# Patient Record
Sex: MALE | Race: WHITE
[De-identification: names, ages, dates, MRNs, and addresses within clinical notes are randomized per-mention and may not be internally consistent; named-entity substitution may affect disease eponyms.]

---

## 2020-05-20 ENCOUNTER — HOSPITAL ENCOUNTER (INPATIENT)
Dept: HOSPITAL 62 - ER | Age: 55
LOS: 2 days | Discharge: HOME | DRG: 603 | End: 2020-05-22
Attending: INTERNAL MEDICINE | Admitting: INTERNAL MEDICINE
Payer: MEDICARE

## 2020-05-20 DIAGNOSIS — L03.213: ICD-10-CM

## 2020-05-20 DIAGNOSIS — Z96.659: ICD-10-CM

## 2020-05-20 DIAGNOSIS — Z86.14: ICD-10-CM

## 2020-05-20 DIAGNOSIS — J44.9: ICD-10-CM

## 2020-05-20 DIAGNOSIS — F17.210: ICD-10-CM

## 2020-05-20 DIAGNOSIS — L03.211: Primary | ICD-10-CM

## 2020-05-20 DIAGNOSIS — Z79.899: ICD-10-CM

## 2020-05-20 DIAGNOSIS — Z03.818: ICD-10-CM

## 2020-05-20 LAB
ADD MANUAL DIFF: NO
ALBUMIN SERPL-MCNC: 3.9 G/DL (ref 3.5–5)
ALP SERPL-CCNC: 126 U/L (ref 38–126)
ANION GAP SERPL CALC-SCNC: 6 MMOL/L (ref 5–19)
AST SERPL-CCNC: 52 U/L (ref 17–59)
BASE EXCESS BLDV CALC-SCNC: -1.4 MMOL/L
BASOPHILS # BLD AUTO: 0.1 10^3/UL (ref 0–0.2)
BASOPHILS NFR BLD AUTO: 0.6 % (ref 0–2)
BILIRUB DIRECT SERPL-MCNC: 0 MG/DL (ref 0–0.4)
BILIRUB SERPL-MCNC: 0.5 MG/DL (ref 0.2–1.3)
BUN SERPL-MCNC: 18 MG/DL (ref 7–20)
CALCIUM: 8.6 MG/DL (ref 8.4–10.2)
CHLORIDE SERPL-SCNC: 103 MMOL/L (ref 98–107)
CO2 SERPL-SCNC: 26 MMOL/L (ref 22–30)
EOSINOPHIL # BLD AUTO: 0.9 10^3/UL (ref 0–0.6)
EOSINOPHIL NFR BLD AUTO: 8 % (ref 0–6)
ERYTHROCYTE [DISTWIDTH] IN BLOOD BY AUTOMATED COUNT: 15.6 % (ref 11.5–14)
GLUCOSE SERPL-MCNC: 109 MG/DL (ref 75–110)
HCO3 BLDV-SCNC: 25.6 MMOL/L (ref 20–32)
HCT VFR BLD CALC: 39.2 % (ref 37.9–51)
HGB BLD-MCNC: 13.2 G/DL (ref 13.5–17)
INR PPP: 0.94
LYMPHOCYTES # BLD AUTO: 1 10^3/UL (ref 0.5–4.7)
LYMPHOCYTES NFR BLD AUTO: 9.3 % (ref 13–45)
MCH RBC QN AUTO: 25.4 PG (ref 27–33.4)
MCHC RBC AUTO-ENTMCNC: 33.8 G/DL (ref 32–36)
MCV RBC AUTO: 75 FL (ref 80–97)
MONOCYTES # BLD AUTO: 1.4 10^3/UL (ref 0.1–1.4)
MONOCYTES NFR BLD AUTO: 12.8 % (ref 3–13)
NEUTROPHILS # BLD AUTO: 7.4 10^3/UL (ref 1.7–8.2)
NEUTS SEG NFR BLD AUTO: 69.3 % (ref 42–78)
PCO2 BLDV: 52.2 MMHG (ref 35–63)
PH BLDV: 7.31 [PH] (ref 7.3–7.42)
PLATELET # BLD: 252 10^3/UL (ref 150–450)
POTASSIUM SERPL-SCNC: 4.4 MMOL/L (ref 3.6–5)
PROT SERPL-MCNC: 6.6 G/DL (ref 6.3–8.2)
PROTHROMBIN TIME: 12.6 SEC (ref 11.4–15.4)
RBC # BLD AUTO: 5.22 10^6/UL (ref 4.35–5.55)
TOTAL CELLS COUNTED % (AUTO): 100 %
WBC # BLD AUTO: 10.7 10^3/UL (ref 4–10.5)

## 2020-05-20 PROCEDURE — 85610 PROTHROMBIN TIME: CPT

## 2020-05-20 PROCEDURE — 87635 SARS-COV-2 COVID-19 AMP PRB: CPT

## 2020-05-20 PROCEDURE — 82803 BLOOD GASES ANY COMBINATION: CPT

## 2020-05-20 PROCEDURE — 87040 BLOOD CULTURE FOR BACTERIA: CPT

## 2020-05-20 PROCEDURE — 80202 ASSAY OF VANCOMYCIN: CPT

## 2020-05-20 PROCEDURE — 85025 COMPLETE CBC W/AUTO DIFF WBC: CPT

## 2020-05-20 PROCEDURE — 96365 THER/PROPH/DIAG IV INF INIT: CPT

## 2020-05-20 PROCEDURE — 99284 EMERGENCY DEPT VISIT MOD MDM: CPT

## 2020-05-20 PROCEDURE — 80053 COMPREHEN METABOLIC PANEL: CPT

## 2020-05-20 PROCEDURE — 83605 ASSAY OF LACTIC ACID: CPT

## 2020-05-20 PROCEDURE — 96375 TX/PRO/DX INJ NEW DRUG ADDON: CPT

## 2020-05-20 PROCEDURE — 70487 CT MAXILLOFACIAL W/DYE: CPT

## 2020-05-20 PROCEDURE — 36415 COLL VENOUS BLD VENIPUNCTURE: CPT

## 2020-05-20 RX ADMIN — KETOROLAC TROMETHAMINE PRN MG: 30 INJECTION, SOLUTION INTRAMUSCULAR at 13:35

## 2020-05-20 RX ADMIN — VANCOMYCIN HYDROCHLORIDE SCH MLS/HR: 1 INJECTION, POWDER, LYOPHILIZED, FOR SOLUTION INTRAVENOUS at 22:05

## 2020-05-20 RX ADMIN — PIPERACILLIN AND TAZOBACTAM SCH MLS/HR: 3; .375 INJECTION, POWDER, LYOPHILIZED, FOR SOLUTION INTRAVENOUS; PARENTERAL at 16:45

## 2020-05-20 RX ADMIN — PIPERACILLIN AND TAZOBACTAM SCH MLS/HR: 3; .375 INJECTION, POWDER, LYOPHILIZED, FOR SOLUTION INTRAVENOUS; PARENTERAL at 20:13

## 2020-05-20 RX ADMIN — VANCOMYCIN HYDROCHLORIDE SCH MLS/HR: 1 INJECTION, POWDER, LYOPHILIZED, FOR SOLUTION INTRAVENOUS at 14:22

## 2020-05-20 RX ADMIN — KETOROLAC TROMETHAMINE PRN MG: 30 INJECTION, SOLUTION INTRAMUSCULAR at 21:35

## 2020-05-20 RX ADMIN — MORPHINE SULFATE PRN MG: 10 INJECTION INTRAMUSCULAR; INTRAVENOUS; SUBCUTANEOUS at 14:38

## 2020-05-20 NOTE — RADIOLOGY REPORT (SQ)
EXAM DESCRIPTION:  CT FACIAL AREA WITH



IMAGES COMPLETED DATE/TIME:  5/20/2020 10:15 am



REASON FOR STUDY:  facial infection, swelling



COMPARISON:  None.



TECHNIQUE:  Post contrast images through the facial bones and orbits windowed for bone and soft tissu
e.  Additional coronal and sagittal reconstructed images reviewed.  All images stored on PACS.

All CT scanners at this facility use dose modulation, iterative reconstruction, and/or weight based d
osing when appropriate to reduce radiation dose to as low as reasonably achievable (ALARA).

[facility]: Dose Right  [facility]: CareDose    [facility]: Dose Right    [facility]: Teradose 4D    [facility]: Smart Technologies



CONTRAST TYPE AND DOSE:  contrast/concentration: Isovue 350.00 mg/ml; Total Contrast Delivered: 75.0 
ml; Total Saline Delivered: 55.0 ml



RENAL FUNCTION:  BUN 18, creatinine 0.73



RADIATION DOSE:  CT Rad equipment meets quality standard of care and radiation dose reduction techniq
ues were employed. CTDIvol: 30.4 mGy. DLP: 650 mGy-cm. .



LIMITATIONS:  None.



FINDINGS:  FACIAL BONES: No fracture or bone lesion.

ORBITS: Intact.  No fracture.  Symmetric intact globes and retroorbital soft tissues.

PARANASAL SINUSES: Mucosal thickening in the maxillary sinuses and ethmoid air cells. Occluded left o
stiomeatal unit.

SOFT TISSUES: There is subcutaneous edema involving the left aspect of the face.  There is asymmetric
 edema overlying the left aspect of the maxilla.  Subtle area of decreased attenuation midline on jennie
ge 37 of series 3.  This could represent volume averaging with surrounding fat.  Developing abscess c
annot be excluded.  This is well demonstrated on image 31 of series 200 as well.

INFERIOR BRAIN: Limited view.  No acute findings.

OTHER: Scattered bilateral cervical nodes most likely reactive.



IMPRESSION:  Suspect left facial cellulitis.  Asymmetric soft tissue attenuation involving the soft t
issues anterior to the left maxillary sinus and maxilla as described.  Developing abscess centrally o
verlying the maxilla cannot be excluded.  This measures approximately 1 x 1 cm in greatest diameter. 
 This is best demonstrated on series 3 image 37.



TECHNICAL DOCUMENTATION:  JOB ID:  [id number]

Quality ID # [id number]: Final reports with documentation of one or more dose reduction techniques (e.g., Au
tomated exposure control, adjustment of the mA and/or kV according to patient size, use of iterative 
reconstruction technique)

 2011 [email or web address]- All Rights Reserved



Reading location - IP/workstation name: [email or web address]

## 2020-05-20 NOTE — ER DOCUMENT REPORT
ED Skin Rash/Insect Bite/Abscs





- General


Chief Complaint: Facial Swelling


Stated Complaint: FACIAL EDEMA


Time Seen by Provider: 05/20/20 08:59


Mode of Arrival: Ambulatory


Information source: Patient


Notes: 





Patient presents complaint of facial swelling for the past 2 days.  Patient 

states that he had a bump inside his nostril and that the area surrounding it 

seemed to get infected.  Patient reports a fever 2 days ago.  Patient does have 

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 

wall.  Patient states he was seen at an urgent care and started on Keflex and 

Bactrim 2 days ago.  Patient denies any improvement despite taking the 

antibiotics.





- HPI


Patient complains to provider of: Tender/swollen area


Onset: Other - 2 days ago


Onset/Duration: Worse


Quality of pain: Sharp


Pain Level: 5


Skin Character: Erythema, Tenderness, Warm


Skin Temperature: Warm


Quality of rash: Painful


Relieved by: Denies


Similar symptoms previously: Yes


Recently seen / treated by doctor: Yes





- Related Data


Allergies/Adverse Reactions: 


                                        





No Known Allergies Allergy (Unverified 05/20/20 10:28)


   








Home Medications: Bactrim.  Breo





Past Medical History





- General


Information source: Patient





- Social History


Smoking Status: Current Every Day Smoker


Chew tobacco use (# tins/day): No


Frequency of alcohol use: None


Drug Abuse: None


Occupation: None


Family History: Reviewed & Not Pertinent


Patient has homicidal ideation: No


Pulmonary Medical History: Reports: Hx COPD


Musculoskeletal Medical History: Reports Hx Arthritis - Chronic back pain


Skin Medical History: Reports Hx MRSA


Past Surgical History: Reports: Hx Orthopedic Surgery - back, knee replacement





Review of Systems





- Review of Systems


Constitutional: No symptoms reported.  denies: Fever


EENT: Other - Tenderness, swelling to the nose and facial area


Cardiovascular: No symptoms reported


Respiratory: No symptoms reported.  denies: Cough, Short of breath


Gastrointestinal: No symptoms reported.  denies: Vomiting


Genitourinary: No symptoms reported


Male Genitourinary: No symptoms reported


Musculoskeletal: No symptoms reported


Skin: Other - Erythema to left facial area, crusted skin lesion inside nostril


Hematologic/Lymphatic: No symptoms reported


Neurological/Psychological: No symptoms reported





Physical Exam





- Vital signs


Vitals: 


                                        











Temp


 


 98.2 F 


 


 05/20/20 07:52














- General


General appearance: Appears well, Alert


In distress: None





- HEENT


Head: Normocephalic, Atraumatic


Eyes: Normal


Conjunctiva: Normal


Nasal: Other - Crusted skin lesion inside the left nostril, small pustular 

lesion outside of left nostril


Mucous membranes: Normal


Pharynx: Normal


Notes: 





Left maxillary facial tenderness with faint erythema





- Respiratory


Respiratory status: No respiratory distress


Chest status: Nontender


Breath sounds: Normal.  No: Rales, Rhonchi, Stridor, Wheezing


Chest palpation: Normal





- Cardiovascular


Rhythm: Regular


Heart sounds: S1 appreciated, S2 appreciated





- Back


Back: Normal





- Extremities


General upper extremity: Normal inspection, Normal strength


General lower extremity: Normal inspection, Normal strength





- Neurological


Neuro grossly intact: Yes


Cognition: Normal


Keller Coma Scale Eye Opening: Spontaneous


Katia Coma Scale Verbal: Oriented


Keller Coma Scale Motor: Obeys Commands


Katia Coma Scale Total: 15





- Psychological


Associated symptoms: Normal affect, Normal mood





- Skin


Skin Temperature: Warm


Skin Moisture: Dry


Skin Color: Erythema - Faint erythema to left maxillary facial area


Skin irregularity: other - Pustular lesion just inferior of left nostril, 

crusted area inside left nostril





Course





- Re-evaluation


Re-evalutation: 





05/20/20 11:25


Consulted with Dr. Sheikh regarding patient presentation and diagnostic 

evaluation.  Recommends consultation with ENT Dr. Duarte,  placed call 

for consult, awaiting return call.


05/20/20 12:00


Consulted with Dr. Duarte and reviewed patient's presentation and diagnostic 

evaluation with him.  Discussed findings on CT scan.  Dr. Hopper feels that 

patient requires admission by the hospitalist at this time for IV antibiotics 

such as when isolated and continuing his Bactrim, although states that 

antibiotic choice can be guided by the hospitalist at this time. Dr Duarte states

that if the hospitalist needs to reimage the patient at some point and patient 

does have findings that require his services, ENT can be consulted at that 

point.





05/20/20 12:12


Consulted with Dr. Lou who does agree to admit patient to a medical floor at 

this time.  Dr. Lou requests that an order for consultation for Dr. Duarte be 

placed.





- Vital Signs


Vital signs: 


                                        











Temp Pulse Resp BP Pulse Ox


 


 99.4 F   82   18   140/69 H  99 


 


 05/20/20 15:55  05/20/20 15:55  05/20/20 15:55  05/20/20 15:55  05/20/20 15:55














- Laboratory


Result Diagrams: 


                                 05/20/20 08:30





                                 05/20/20 08:30


Laboratory results interpreted by me: 


                                        











  05/20/20 05/20/20





  08:30 08:30


 


WBC  10.7 H 


 


Hgb  13.2 L 


 


MCV  75 L 


 


MCH  25.4 L 


 


RDW  15.6 H 


 


Lymph % (Auto)  9.3 L 


 


Eos % (Auto)  8.0 H 


 


Absolute Eos (auto)  0.9 H 


 


Sodium   135.0 L











05/20/20 12:13





                               Labs- Entire Visit











  05/20/20 05/20/20 05/20/20





  08:30 08:30 08:30


 


WBC  10.7 H  


 


RBC  5.22  


 


Hgb  13.2 L  


 


Hct  39.2  


 


MCV  75 L  


 


MCH  25.4 L  


 


MCHC  33.8  


 


RDW  15.6 H  


 


Plt Count  252  


 


Lymph % (Auto)  9.3 L  


 


Mono % (Auto)  12.8  


 


Eos % (Auto)  8.0 H  


 


Baso % (Auto)  0.6  


 


Absolute Neuts (auto)  7.4  


 


Absolute Lymphs (auto)  1.0  


 


Absolute Monos (auto)  1.4  


 


Absolute Eos (auto)  0.9 H  


 


Absolute Basos (auto)  0.1  


 


Seg Neutrophils %  69.3  


 


PT   12.6 


 


INR   0.94 


 


VBG pH   


 


VBG pCO2   


 


VBG HCO3   


 


VBG Base Excess   


 


Sodium    135.0 L


 


Potassium    4.4


 


Chloride    103


 


Carbon Dioxide    26


 


Anion Gap    6


 


BUN    18


 


Creatinine    0.73


 


Est GFR ( Amer)    > 60


 


Est GFR (MDRD) Non-Af    > 60


 


Glucose    109


 


Lactic Acid   


 


Calcium    8.6


 


Total Bilirubin    0.5


 


Direct Bilirubin    0.0


 


Neonat Total Bilirubin    Not Reportable


 


Neonat Direct Bilirubin    Not Reportable


 


Neonat Indirect Bili    Not Reportable


 


AST    52


 


ALT    48


 


Alkaline Phosphatase    126


 


Total Protein    6.6


 


Albumin    3.9














  05/20/20 05/20/20





  08:30 08:30


 


WBC  


 


RBC  


 


Hgb  


 


Hct  


 


MCV  


 


MCH  


 


MCHC  


 


RDW  


 


Plt Count  


 


Lymph % (Auto)  


 


Mono % (Auto)  


 


Eos % (Auto)  


 


Baso % (Auto)  


 


Absolute Neuts (auto)  


 


Absolute Lymphs (auto)  


 


Absolute Monos (auto)  


 


Absolute Eos (auto)  


 


Absolute Basos (auto)  


 


Seg Neutrophils %  


 


PT  


 


INR  


 


VBG pH  7.31 


 


VBG pCO2  52.2 


 


VBG HCO3  25.6 


 


VBG Base Excess  -1.4 


 


Sodium  


 


Potassium  


 


Chloride  


 


Carbon Dioxide  


 


Anion Gap  


 


BUN  


 


Creatinine  


 


Est GFR ( Amer)  


 


Est GFR (MDRD) Non-Af  


 


Glucose  


 


Lactic Acid   0.9


 


Calcium  


 


Total Bilirubin  


 


Direct Bilirubin  


 


Neonat Total Bilirubin  


 


Neonat Direct Bilirubin  


 


Neonat Indirect Bili  


 


AST  


 


ALT  


 


Alkaline Phosphatase  


 


Total Protein  


 


Albumin  














- Diagnostic Test


Radiology reviewed: Reports reviewed





Discharge





- Discharge


Clinical Impression: 


 Facial cellulitis





Condition: Stable


Disposition: ADMITTED AS OBSERVATION


Admitting Provider: Carmine (Hospitalist)


Unit Admitted: Medical Floor

## 2020-05-20 NOTE — PDOC H&P
History of Present Illness


Admission Date/PCP: 


  05/20/20 12:32





  





Patient complains of: Left facial swelling and pain


History of Present Illness: 


DARCI LOERA III is a 54 year old male with a history of COPD, 

arthritis, who presents to the hospital with complaints of pain in his left 

facial region.  Symptoms started a few days ago as pain in his left nostril 

associated with a slight bump there.  He states that he squeezed the bump 

without expression of any fluid and then the next day the swelling in his left 

face worsened and now starting to progress towards his periorbital region.  He 

saw his primary care provider who prescribed him Bactrim and Keflex as well as a

topical medication which she has been taking for the past 2 days.  However his 

symptoms especially the swelling in his face has worsened which prompted him to 

seek medical care at the hospital.  He admits to a history of MRSA abscess to 

his lower extremities requiring drainage.








Past Medical History


Pulmonary Medical History: Reports: Chronic Obstructive Pulmonary Disease (COPD)





Past Surgical History


Past Surgical History: Reports: Orthopedic Surgery - back, knee replacement





Social History


Smoking Status: Current Every Day Smoker


Electronic Cigarette use?: No


Frequency of Alcohol Use: Rare


Hx Recreational Drug Use: No





- Advance Directive


Resuscitation Status: Full Code





Family History


Family History: Hypertension


Parental Family History Reviewed: Yes


Children Family History Reviewed: Unknown


Sibling(s) Family History Reviewed.: Unknown





Medication/Allergy


Allergies/Adverse Reactions: 


                                        





No Known Allergies Allergy (Unverified 05/20/20 10:28)


   











Review of Systems


Constitutional: PRESENT: chills, fever(s) - A few days ago


Eyes: PRESENT: other - Denies ophthalmoplegia, chemosis or eye pain.  ABSENT: 

visual disturbances


Ears: ABSENT: hearing changes


Nose, Mouth, and Throat: ABSENT: headache(s)


Cardiovascular: ABSENT: chest pain


Respiratory: ABSENT: cough, dyspnea, sputum


Gastrointestinal: ABSENT: abdominal pain, nausea, vomiting


Genitourinary: ABSENT: dysuria


Neurological: ABSENT: dizziness


Psychiatric: ABSENT: anxiety


Endocrine: ABSENT: polyuria


Allergic/Immunologic: PRESENT: seasonal rhinorrhea





Physical Exam


Vital Signs: 


                                        











Temp Pulse Resp BP Pulse Ox


 


 98.2 F   75   20   142/85 H  100 


 


 05/20/20 08:00  05/20/20 08:00  05/20/20 08:00  05/20/20 08:00  05/20/20 08:00








                                 Intake & Output











 05/19/20 05/20/20 05/21/20





 06:59 06:59 06:59


 


Weight   95.254 kg











General appearance: PRESENT: no acute distress, cooperative


Head exam: PRESENT: other - Left facial swelling involving upper and lower face 

and preseptal region with erythema.  Mild erythema of the left nasal cavity.


Eye exam: PRESENT: other - No chemosis.  ABSENT: conjunctival injection, scleral

icterus


Mouth exam: PRESENT: neck supple


Neck exam: ABSENT: meningismus, tenderness


Respiratory exam: PRESENT: clear to auscultation rose, unlabored


Cardiovascular exam: PRESENT: RRR, +S1, +S2


GI/Abdominal exam: PRESENT: soft.  ABSENT: tenderness


Extremities exam: ABSENT: calf tenderness, pedal edema


Neurological exam: PRESENT: alert, awake


Psychiatric exam: ABSENT: agitated, anxious


Focused psych exam: ABSENT: pressured speech


Skin exam: ABSENT: jaundice





Results


Laboratory Results: 


                                        





                                 05/20/20 08:30 





                                 05/20/20 08:30 





                                        











  05/20/20 05/20/20 05/20/20





  08:30 08:30 08:30


 


WBC  10.7 H  


 


RBC  5.22  


 


Hgb  13.2 L  


 


Hct  39.2  


 


MCV  75 L  


 


MCH  25.4 L  


 


MCHC  33.8  


 


RDW  15.6 H  


 


Plt Count  252  


 


Seg Neutrophils %  69.3  


 


VBG pH    7.31


 


VBG pCO2    52.2


 


VBG HCO3    25.6


 


VBG Base Excess    -1.4


 


Sodium   135.0 L 


 


Potassium   4.4 


 


Chloride   103 


 


Carbon Dioxide   26 


 


Anion Gap   6 


 


BUN   18 


 


Creatinine   0.73 


 


Est GFR ( Amer)   > 60 


 


Glucose   109 


 


Lactic Acid   


 


Calcium   8.6 


 


Total Bilirubin   0.5 


 


AST   52 


 


Alkaline Phosphatase   126 


 


Total Protein   6.6 


 


Albumin   3.9 














  05/20/20





  08:30


 


WBC 


 


RBC 


 


Hgb 


 


Hct 


 


MCV 


 


MCH 


 


MCHC 


 


RDW 


 


Plt Count 


 


Seg Neutrophils % 


 


VBG pH 


 


VBG pCO2 


 


VBG HCO3 


 


VBG Base Excess 


 


Sodium 


 


Potassium 


 


Chloride 


 


Carbon Dioxide 


 


Anion Gap 


 


BUN 


 


Creatinine 


 


Est GFR (African Amer) 


 


Glucose 


 


Lactic Acid  0.9


 


Calcium 


 


Total Bilirubin 


 


AST 


 


Alkaline Phosphatase 


 


Total Protein 


 


Albumin 











Impressions: 


                                        





Facial Bones CT  05/20/20 09:20


IMPRESSION:  Suspect left facial cellulitis.  Asymmetric soft tissue attenuation

involving the soft tissues anterior to the left maxillary sinus and maxilla as 

described.  Developing abscess centrally overlying the maxilla cannot be 

excluded.  This measures approximately 1 x 1 cm in greatest diameter.  This is 

best demonstrated on series 3 image 37.


 














Assessment and Plan





- Diagnosis


(1) Facial cellulitis


Is this a current diagnosis for this admission?: Yes   


Plan: 


Facial cellulitis with preseptal extension.  No evidence of orbital involvement.

 Failed outpatient therapy with worsening of symptoms after 48 hours of Bactrim 

and Keflex.


CT showing soft tissue swelling with questionable developing abscess in the left

maxillary sinus.


Blood cultures obtained


Start on vancomycin and Zosyn


Received IV morphine. Tylenol and IV Toradol as needed pain


Monitor leukocytosis via CBC








(2) COPD (chronic obstructive pulmonary disease)


Qualifiers: 


   COPD type: unspecified COPD   Qualified Code(s): J44.9 - Chronic obstructive 

pulmonary disease, unspecified   


Is this a current diagnosis for this admission?: Yes   


Plan: 


Not seen at acute exacerbation.  LABA/L AMA/ICS.  Duo nebs as needed.








(3) Tobacco abuse


Is this a current diagnosis for this admission?: Yes   


Plan: 


Nicotine patch will be provided.








- Time


Time Spent with patient: 25-34 minutes





- Inpatient Certification


Medical Necessity: Failure to Improve With Outpatient Therapy

## 2020-05-21 LAB
ADD MANUAL DIFF: NO
BASOPHILS # BLD AUTO: 0.1 10^3/UL (ref 0–0.2)
BASOPHILS NFR BLD AUTO: 0.7 % (ref 0–2)
EOSINOPHIL # BLD AUTO: 1.1 10^3/UL (ref 0–0.6)
EOSINOPHIL NFR BLD AUTO: 12.6 % (ref 0–6)
ERYTHROCYTE [DISTWIDTH] IN BLOOD BY AUTOMATED COUNT: 15.6 % (ref 11.5–14)
HCT VFR BLD CALC: 40.2 % (ref 37.9–51)
HGB BLD-MCNC: 13.5 G/DL (ref 13.5–17)
LYMPHOCYTES # BLD AUTO: 1.4 10^3/UL (ref 0.5–4.7)
LYMPHOCYTES NFR BLD AUTO: 17.2 % (ref 13–45)
MCH RBC QN AUTO: 25.1 PG (ref 27–33.4)
MCHC RBC AUTO-ENTMCNC: 33.6 G/DL (ref 32–36)
MCV RBC AUTO: 75 FL (ref 80–97)
MONOCYTES # BLD AUTO: 1.1 10^3/UL (ref 0.1–1.4)
MONOCYTES NFR BLD AUTO: 12.5 % (ref 3–13)
NEUTROPHILS # BLD AUTO: 4.8 10^3/UL (ref 1.7–8.2)
NEUTS SEG NFR BLD AUTO: 57 % (ref 42–78)
PLATELET # BLD: 310 10^3/UL (ref 150–450)
RBC # BLD AUTO: 5.37 10^6/UL (ref 4.35–5.55)
TOTAL CELLS COUNTED % (AUTO): 100 %
VANCOMYCIN,TROUGH: 10.7 UG/ML (ref 5–20)
WBC # BLD AUTO: 8.4 10^3/UL (ref 4–10.5)

## 2020-05-21 RX ADMIN — PIPERACILLIN AND TAZOBACTAM SCH MLS/HR: 3; .375 INJECTION, POWDER, LYOPHILIZED, FOR SOLUTION INTRAVENOUS; PARENTERAL at 10:04

## 2020-05-21 RX ADMIN — MORPHINE SULFATE PRN MG: 10 INJECTION INTRAMUSCULAR; INTRAVENOUS; SUBCUTANEOUS at 02:09

## 2020-05-21 RX ADMIN — VANCOMYCIN HYDROCHLORIDE SCH MLS/HR: 1 INJECTION, POWDER, LYOPHILIZED, FOR SOLUTION INTRAVENOUS at 21:49

## 2020-05-21 RX ADMIN — VANCOMYCIN HYDROCHLORIDE SCH MLS/HR: 1 INJECTION, POWDER, LYOPHILIZED, FOR SOLUTION INTRAVENOUS at 13:32

## 2020-05-21 RX ADMIN — MORPHINE SULFATE PRN MG: 10 INJECTION INTRAMUSCULAR; INTRAVENOUS; SUBCUTANEOUS at 17:25

## 2020-05-21 RX ADMIN — FLUTICASONE FUROATE, UMECLIDINIUM BROMIDE AND VILANTEROL TRIFENATATE SCH INHALER: 100; 62.5; 25 POWDER RESPIRATORY (INHALATION) at 10:04

## 2020-05-21 RX ADMIN — PIPERACILLIN AND TAZOBACTAM SCH MLS/HR: 3; .375 INJECTION, POWDER, LYOPHILIZED, FOR SOLUTION INTRAVENOUS; PARENTERAL at 02:13

## 2020-05-21 RX ADMIN — ENOXAPARIN SODIUM SCH: 40 INJECTION SUBCUTANEOUS at 10:05

## 2020-05-21 RX ADMIN — MORPHINE SULFATE PRN MG: 10 INJECTION INTRAMUSCULAR; INTRAVENOUS; SUBCUTANEOUS at 11:08

## 2020-05-21 RX ADMIN — VANCOMYCIN HYDROCHLORIDE SCH MLS/HR: 1 INJECTION, POWDER, LYOPHILIZED, FOR SOLUTION INTRAVENOUS at 05:39

## 2020-05-21 RX ADMIN — PIPERACILLIN AND TAZOBACTAM SCH MLS/HR: 3; .375 INJECTION, POWDER, LYOPHILIZED, FOR SOLUTION INTRAVENOUS; PARENTERAL at 21:05

## 2020-05-21 RX ADMIN — PIPERACILLIN AND TAZOBACTAM SCH MLS/HR: 3; .375 INJECTION, POWDER, LYOPHILIZED, FOR SOLUTION INTRAVENOUS; PARENTERAL at 16:06

## 2020-05-21 NOTE — PDOC PROGRESS REPORT
Subjective


Progress Note for:: 05/21/20


Subjective:: 





Patient states that he feels better overall.  The pain in his left side of face 

is improved.  Denies fever chills.


Reason For Visit: 


FACIAL CELLULITIS








Physical Exam


Vital Signs: 


                                        











Temp Pulse Resp BP Pulse Ox


 


 99.8 F   77   16   145/83 H  98 


 


 05/21/20 11:33  05/21/20 15:15  05/21/20 14:11  05/21/20 15:15  05/21/20 15:15








                                 Intake & Output











 05/20/20 05/21/20 05/22/20





 06:59 06:59 06:59


 


Intake Total  1576 1300


 


Balance  1576 1300


 


Weight  90.7 kg 











General appearance: PRESENT: no acute distress, cooperative


Head exam: PRESENT: other - Redness and swelling in the left face has improved 

significantly.  Mild erythema still noted.


Eye exam: PRESENT: other - No evidence of chemosis.  ABSENT: periorbital 

swelling


Neck exam: ABSENT: JVD


Respiratory exam: PRESENT: unlabored


Neurological exam: PRESENT: alert, awake





Results


Laboratory Results: 


                                        





                                 05/21/20 04:50 





                                 05/20/20 08:30 





                                        











  05/21/20





  04:50


 


WBC  8.4


 


RBC  5.37


 


Hgb  13.5


 


Hct  40.2


 


MCV  75 L


 


MCH  25.1 L


 


MCHC  33.6


 


RDW  15.6 H


 


Plt Count  310


 


Seg Neutrophils %  57.0











Impressions: 


                                        





Facial Bones CT  05/20/20 09:20


IMPRESSION:  Suspect left facial cellulitis.  Asymmetric soft tissue attenuation

involving the soft tissues anterior to the left maxillary sinus and maxilla as 

described.  Developing abscess centrally overlying the maxilla cannot be 

excluded.  This measures approximately 1 x 1 cm in greatest diameter.  This is 

best demonstrated on series 3 image 37.


 














Assessment and Plan





- Diagnosis


(1) Facial cellulitis


Is this a current diagnosis for this admission?: Yes   


Plan: 


Leukocytosis and tach resolved.  Blood cultures negative at 24 hours.  Continue 

vancomycin and Zosyn.  Continue pain medications.








(2) COPD (chronic obstructive pulmonary disease)


Qualifiers: 


   COPD type: unspecified COPD   Qualified Code(s): J44.9 - Chronic obstructive 

pulmonary disease, unspecified   


Is this a current diagnosis for this admission?: Yes   


Plan: 


Not seen at acute exacerbation.  LABA/L AMA/ICS.  Duo nebs as needed.








(3) Tobacco abuse


Is this a current diagnosis for this admission?: Yes   


Plan: 


Nicotine patch provided.








- Time


Time Spent with patient: Less than 15 minutes

## 2020-05-22 VITALS — SYSTOLIC BLOOD PRESSURE: 140 MMHG | DIASTOLIC BLOOD PRESSURE: 69 MMHG

## 2020-05-22 RX ADMIN — PIPERACILLIN AND TAZOBACTAM SCH MLS/HR: 3; .375 INJECTION, POWDER, LYOPHILIZED, FOR SOLUTION INTRAVENOUS; PARENTERAL at 09:30

## 2020-05-22 RX ADMIN — ENOXAPARIN SODIUM SCH: 40 INJECTION SUBCUTANEOUS at 09:30

## 2020-05-22 RX ADMIN — FLUTICASONE FUROATE, UMECLIDINIUM BROMIDE AND VILANTEROL TRIFENATATE SCH INHALER: 100; 62.5; 25 POWDER RESPIRATORY (INHALATION) at 09:32

## 2020-05-22 RX ADMIN — MORPHINE SULFATE PRN MG: 10 INJECTION INTRAMUSCULAR; INTRAVENOUS; SUBCUTANEOUS at 00:19

## 2020-05-22 RX ADMIN — KETOROLAC TROMETHAMINE PRN MG: 30 INJECTION, SOLUTION INTRAMUSCULAR at 08:48

## 2020-05-22 RX ADMIN — MORPHINE SULFATE PRN MG: 10 INJECTION INTRAMUSCULAR; INTRAVENOUS; SUBCUTANEOUS at 06:22

## 2020-05-22 RX ADMIN — PIPERACILLIN AND TAZOBACTAM SCH MLS/HR: 3; .375 INJECTION, POWDER, LYOPHILIZED, FOR SOLUTION INTRAVENOUS; PARENTERAL at 03:45

## 2020-05-22 RX ADMIN — VANCOMYCIN HYDROCHLORIDE SCH MLS/HR: 1 INJECTION, POWDER, LYOPHILIZED, FOR SOLUTION INTRAVENOUS at 05:25

## 2020-05-22 NOTE — PDOC DISCHARGE SUMMARY
Impression





- Admit/DC Date/PCP


Admission Date/Primary Care Provider: 


  05/20/20 12:32





  





Discharge Date: 05/22/20





- Discharge Diagnosis


(1) Facial cellulitis


Is this a current diagnosis for this admission?: Yes   





(2) COPD (chronic obstructive pulmonary disease)


Is this a current diagnosis for this admission?: Yes   





(3) Tobacco abuse


Is this a current diagnosis for this admission?: Yes   





- Additional Information


Resuscitation Status: Full Code


Discharge Diet: As Tolerated


Prescriptions: 


Amoxicillin/Potassium Clav [Augmentin 875-125 Tablet] 1 tab PO Q12 9 Days #18 

tablet


Sulfamethoxazole/Trimethoprim [Bactrim Ds Tablet] 1 each PO Q12 8 Days  tablet


Home Medications: 








Albuterol Sulfate [Albuterol Sulfate Hfa] 2 puff IH Q6HP PRN 05/20/20 


Cetirizine HCl [Zyrtec 10 mg Tablet] 10 mg PO DAILY 05/20/20 


Diclofenac Sodium 75 mg PO BID 05/20/20 


Fluticasone/Vilanterol [Breo Ellipta 200-25 Mcg INH] 1 inh IH DAILY 05/20/20 


Multivit-Min/Folic/Vit K/Lycop [One-A-Day Men's 50 Plus Tablet] 1 each PO DAILY 

05/20/20 


Omeprazole 20 mg PO DAILY 05/20/20 


Umeclidinium Bromide [Incruse Ellipta] 1 inh IH DAILY 05/20/20 


Amoxicillin/Potassium Clav [Augmentin 875-125 Tablet] 1 tab PO Q12 9 Days #18 

tablet 05/22/20 


Sulfamethoxazole/Trimethoprim [Bactrim Ds Tablet] 1 each PO Q12 8 Days  tablet 

05/22/20 











History of Present Illiness


History of Present Illness: 


DARCI LOERA III is a 54 year old male with a history of COPD, 

arthritis, who presents to the hospital with complaints of pain in his left 

facial region.  Symptoms started a few days ago as pain in his left nostril 

associated with a slight bump there.  He states that he squeezed the bump 

without expression of any fluid and then the next day the swelling in his left 

face worsened and now starting to progress towards his periorbital region.  He 

saw his primary care provider who prescribed him Bactrim and Keflex as well as a

topical medication which she has been taking for the past 2 days.  However his 

symptoms especially the swelling in his face has worsened which prompted him to 

seek medical care at the hospital.  He admits to a history of MRSA abscess to 

his lower extremities requiring drainage.








Hospital Course


Hospital Course: 


Patient was admitted for treatment of left facial cellulitis with preseptal 

cellulitis/involvement.  Patient underwent facial CT which showed questionable 

developing abscess but upon evaluation by ENT who discussed case with ER 

provider, there was no drainable abscess noted and her ENT had only recommended 

treatment with antibiotics and would only need further intervention if no 

improvement.  Patient symptoms significantly improved after being started on IV 

vancomycin and Zosyn.  Blood cultures were negative at 48-hour oscar.  Patient 

swelling went down substantially and patient is ready and stable for discharge 

today.  Patient still has prescriptions for he has Bactrim at home and has 8 

days worth which have instructed him to finish up and have also added 9 days of 

Augmentin.





Physical Exam


Vital Signs: 


                                        











Temp Pulse Resp BP Pulse Ox


 


 99.0 F   101 H  16   140/82 H  93 


 


 05/22/20 08:25  05/22/20 09:10  05/22/20 09:10  05/22/20 08:25  05/22/20 09:10








                                 Intake & Output











 05/21/20 05/22/20 05/23/20





 06:59 06:59 06:59


 


Intake Total 1576 2360 


 


Balance 1576 2360 


 


Weight 90.7 kg 90.7 kg 











General appearance: PRESENT: no acute distress, cooperative


Head exam: PRESENT: other - Facial swelling improved significantly down to the 

bare minimum.





Results


Laboratory Results: 


                                        











WBC  8.4 10^3/uL (4.0-10.5)   05/21/20  04:50    


 


RBC  5.37 10^6/uL (4.35-5.55)   05/21/20  04:50    


 


Hgb  13.5 g/dL (13.5-17.0)   05/21/20  04:50    


 


Hct  40.2 % (37.9-51.0)   05/21/20  04:50    


 


MCV  75 fl (80-97)  L  05/21/20  04:50    


 


MCH  25.1 pg (27.0-33.4)  L  05/21/20  04:50    


 


MCHC  33.6 g/dL (32.0-36.0)   05/21/20  04:50    


 


RDW  15.6 % (11.5-14.0)  H  05/21/20  04:50    


 


Plt Count  310 10^3/uL (150-450)   05/21/20  04:50    


 


Lymph % (Auto)  17.2 % (13-45)   05/21/20  04:50    


 


Mono % (Auto)  12.5 % (3-13)   05/21/20  04:50    


 


Eos % (Auto)  12.6 % (0-6)  H  05/21/20  04:50    


 


Baso % (Auto)  0.7 % (0-2)   05/21/20  04:50    


 


Absolute Neuts (auto)  4.8 10^3/uL (1.7-8.2)   05/21/20  04:50    


 


Absolute Lymphs (auto)  1.4 10^3/uL (0.5-4.7)   05/21/20  04:50    


 


Absolute Monos (auto)  1.1 10^3/uL (0.1-1.4)   05/21/20  04:50    


 


Absolute Eos (auto)  1.1 10^3/uL (0.0-0.6)  H  05/21/20  04:50    


 


Absolute Basos (auto)  0.1 10^3/uL (0.0-0.2)   05/21/20  04:50    


 


Seg Neutrophils %  57.0 % (42-78)   05/21/20  04:50    


 


PT  12.6 SEC (11.4-15.4)   05/20/20  08:30    


 


INR  0.94   05/20/20  08:30    


 


VBG pH  7.31  (7.30-7.42)   05/20/20  08:30    


 


VBG pCO2  52.2 mmHg (35-63)   05/20/20  08:30    


 


VBG HCO3  25.6 mmol/L (20-32)   05/20/20  08:30    


 


VBG Base Excess  -1.4 mmol/L  05/20/20  08:30    


 


Sodium  135.0 mmol/L (137-145)  L  05/20/20  08:30    


 


Potassium  4.4 mmol/L (3.6-5.0)   05/20/20  08:30    


 


Chloride  103 mmol/L ()   05/20/20  08:30    


 


Carbon Dioxide  26 mmol/L (22-30)   05/20/20  08:30    


 


Anion Gap  6  (5-19)   05/20/20  08:30    


 


BUN  18 mg/dL (7-20)   05/20/20  08:30    


 


Creatinine  0.73 mg/dL (0.52-1.25)   05/20/20  08:30    


 


Est GFR ( Amer)  > 60  (>60)   05/20/20  08:30    


 


Est GFR (MDRD) Non-Af  > 60  (>60)   05/20/20  08:30    


 


Glucose  109 mg/dL ()   05/20/20  08:30    


 


Lactic Acid  0.9 mmol/L (0.7-2.1)   05/20/20  08:30    


 


Calcium  8.6 mg/dL (8.4-10.2)   05/20/20  08:30    


 


Total Bilirubin  0.5 mg/dL (0.2-1.3)   05/20/20  08:30    


 


Direct Bilirubin  0.0 mg/dL (0.0-0.4)   05/20/20  08:30    


 


Neonat Total Bilirubin  Not Reportable   05/20/20  08:30    


 


Neonat Direct Bilirubin  Not Reportable   05/20/20  08:30    


 


Neonat Indirect Bili  Not Reportable   05/20/20  08:30    


 


AST  52 U/L (17-59)   05/20/20  08:30    


 


ALT  48 U/L (<50)   05/20/20  08:30    


 


Alkaline Phosphatase  126 U/L ()   05/20/20  08:30    


 


Total Protein  6.6 g/dL (6.3-8.2)   05/20/20  08:30    


 


Albumin  3.9 g/dL (3.5-5.0)   05/20/20  08:30    


 


Time Trough Drawn  1325   05/21/20  13:25    


 


Vancomycin Trough  10.7 ug/mL (5.0-20.0)   05/21/20  13:25    


 


COVID-19 Source  Cancelled   05/20/20  13:15    


 


COVID-19 (ESTEFANI)  Cancelled   05/20/20  13:15    


 


SARS-CoV-2 (PCR)  NEGATIVE  (NEGATIVE)   05/20/20  13:15    











Impressions: 


                                        





Facial Bones CT  05/20/20 09:20


IMPRESSION:  Suspect left facial cellulitis.  Asymmetric soft tissue attenuation

involving the soft tissues anterior to the left maxillary sinus and maxilla as 

described.  Developing abscess centrally overlying the maxilla cannot be 

excluded.  This measures approximately 1 x 1 cm in greatest diameter.  This is b

est demonstrated on series 3 image 37.


 














Plan


Time Spent: Less than 30 Minutes





Stroke


Is this a Stroke Patient?: No





Acute Heart Failure





- **


Is this a Heart Failure Patient?: No

## 2020-07-10 ENCOUNTER — HOSPITAL ENCOUNTER (EMERGENCY)
Dept: HOSPITAL 62 - ER | Age: 55
Discharge: HOME | End: 2020-07-10
Payer: MEDICARE

## 2020-07-10 VITALS — SYSTOLIC BLOOD PRESSURE: 159 MMHG | DIASTOLIC BLOOD PRESSURE: 97 MMHG

## 2020-07-10 DIAGNOSIS — R05: ICD-10-CM

## 2020-07-10 DIAGNOSIS — R00.0: ICD-10-CM

## 2020-07-10 DIAGNOSIS — R06.02: ICD-10-CM

## 2020-07-10 DIAGNOSIS — Z79.899: ICD-10-CM

## 2020-07-10 DIAGNOSIS — F17.210: ICD-10-CM

## 2020-07-10 DIAGNOSIS — J44.1: Primary | ICD-10-CM

## 2020-07-10 LAB
ADD MANUAL DIFF: NO
ALBUMIN SERPL-MCNC: 4.6 G/DL (ref 3.5–5)
ALP SERPL-CCNC: 92 U/L (ref 38–126)
ANION GAP SERPL CALC-SCNC: 9 MMOL/L (ref 5–19)
AST SERPL-CCNC: 23 U/L (ref 17–59)
BASE EXCESS BLDV CALC-SCNC: -2.6 MMOL/L
BASOPHILS # BLD AUTO: 0.1 10^3/UL (ref 0–0.2)
BASOPHILS NFR BLD AUTO: 0.6 % (ref 0–2)
BILIRUB DIRECT SERPL-MCNC: 0 MG/DL (ref 0–0.4)
BILIRUB SERPL-MCNC: 0.6 MG/DL (ref 0.2–1.3)
BUN SERPL-MCNC: 17 MG/DL (ref 7–20)
CALCIUM: 9 MG/DL (ref 8.4–10.2)
CHLORIDE SERPL-SCNC: 104 MMOL/L (ref 98–107)
CK SERPL-CCNC: 71 U/L (ref 55–170)
CO2 SERPL-SCNC: 25 MMOL/L (ref 22–30)
EOSINOPHIL # BLD AUTO: 1.5 10^3/UL (ref 0–0.6)
EOSINOPHIL NFR BLD AUTO: 16.1 % (ref 0–6)
ERYTHROCYTE [DISTWIDTH] IN BLOOD BY AUTOMATED COUNT: 15.9 % (ref 11.5–14)
GLUCOSE SERPL-MCNC: 138 MG/DL (ref 75–110)
HCO3 BLDV-SCNC: 23.5 MMOL/L (ref 20–32)
HCT VFR BLD CALC: 45.1 % (ref 37.9–51)
HGB BLD-MCNC: 15 G/DL (ref 13.5–17)
LYMPHOCYTES # BLD AUTO: 2.2 10^3/UL (ref 0.5–4.7)
LYMPHOCYTES NFR BLD AUTO: 23.5 % (ref 13–45)
MCH RBC QN AUTO: 25.3 PG (ref 27–33.4)
MCHC RBC AUTO-ENTMCNC: 33.3 G/DL (ref 32–36)
MCV RBC AUTO: 76 FL (ref 80–97)
MONOCYTES # BLD AUTO: 1.1 10^3/UL (ref 0.1–1.4)
MONOCYTES NFR BLD AUTO: 11.7 % (ref 3–13)
NEUTROPHILS # BLD AUTO: 4.4 10^3/UL (ref 1.7–8.2)
NEUTS SEG NFR BLD AUTO: 48.1 % (ref 42–78)
PCO2 BLDV: 45.4 MMHG (ref 35–63)
PH BLDV: 7.33 [PH] (ref 7.3–7.42)
PLATELET # BLD: 313 10^3/UL (ref 150–450)
POTASSIUM SERPL-SCNC: 4.3 MMOL/L (ref 3.6–5)
PROT SERPL-MCNC: 7.4 G/DL (ref 6.3–8.2)
RBC # BLD AUTO: 5.94 10^6/UL (ref 4.35–5.55)
TOTAL CELLS COUNTED % (AUTO): 100 %
WBC # BLD AUTO: 9.2 10^3/UL (ref 4–10.5)

## 2020-07-10 PROCEDURE — 83735 ASSAY OF MAGNESIUM: CPT

## 2020-07-10 PROCEDURE — 84484 ASSAY OF TROPONIN QUANT: CPT

## 2020-07-10 PROCEDURE — 82803 BLOOD GASES ANY COMBINATION: CPT

## 2020-07-10 PROCEDURE — 83605 ASSAY OF LACTIC ACID: CPT

## 2020-07-10 PROCEDURE — 36415 COLL VENOUS BLD VENIPUNCTURE: CPT

## 2020-07-10 PROCEDURE — 82550 ASSAY OF CK (CPK): CPT

## 2020-07-10 PROCEDURE — 93010 ELECTROCARDIOGRAM REPORT: CPT

## 2020-07-10 PROCEDURE — 80053 COMPREHEN METABOLIC PANEL: CPT

## 2020-07-10 PROCEDURE — 85025 COMPLETE CBC W/AUTO DIFF WBC: CPT

## 2020-07-10 PROCEDURE — 93005 ELECTROCARDIOGRAM TRACING: CPT

## 2020-07-10 PROCEDURE — 99285 EMERGENCY DEPT VISIT HI MDM: CPT

## 2020-07-10 PROCEDURE — 87040 BLOOD CULTURE FOR BACTERIA: CPT

## 2020-07-10 PROCEDURE — 71045 X-RAY EXAM CHEST 1 VIEW: CPT

## 2020-07-10 NOTE — EKG REPORT
SEVERITY:- ABNORMAL ECG -

SINUS TACHYCARDIA

BIATRIAL ABNORMALITIES

LEFT POSTERIOR FASCICULAR BLOCK

:

Confirmed by: Jamie Pond MD 10-Jul-2020 23:13:03

## 2020-07-10 NOTE — ER DOCUMENT REPORT
ED General





- General


Chief Complaint: Shortness Of Breath


Stated Complaint: SHORTNESS OF BREATH


Time Seen by Provider: 07/10/20 20:06


Mode of Arrival: Medic


Information source: Patient, Emergency Med Personnel


Notes: 





Patient is a 54-year-old male presenting to the emergency department chief co

mplaint of severe shortness of breath.  Patient states that it began yesterday 

and progressively has worsened.  Patient denies travel history trauma history 

sick contacts no obvious changes in medication or activities of daily living.  

Patient does continue to smoke however.  Patient denies fevers chills nausea 

vomiting or diarrhea.


TRAVEL OUTSIDE OF THE [place] IN LAST 30 DAYS: No





- HPI


Onset: Yesterday


Onset/Duration: Gradual, Worse


Quality of pain: No pain


Severity: None


Pain Level: 0


Associated symptoms: Nonproductive cough, Shortness of breath.  denies: 

Diarrhea, Leg swelling, Nausea, Vomiting


Exacerbated by: Movement, Coughing, Deep breathing


Relieved by: Denies


Similar symptoms previously: Yes


Recently seen / treated by doctor: No





- Related Data


Allergies/Adverse Reactions: 


                                        





No Known Allergies Allergy (Unverified 05/20/20 10:28)


   








Home Medications: Duoneb





Past Medical History





- General


Information source: Patient, Emergency Med Personnel





- Social History


Smoking Status: Current Every Day Smoker


Cigarette use (# per day): Yes


Chew tobacco use (# tins/day): No


Smoking Education Provided: Yes


Frequency of alcohol use: None


Drug Abuse: None


Lives with: Family


Family History: Reviewed & Not Pertinent


Patient has suicidal ideation: No


Patient has homicidal ideation: No


Pulmonary Medical History: Reports: Hx COPD


Musculoskeletal Medical History: Reports Hx Arthritis - Chronic back pain


Skin Medical History: Reports Hx MRSA


Psychiatric Medical History: 


   Denies: Hx Depression


Past Surgical History: Reports: Hx Orthopedic Surgery - back, knee replacement





Review of Systems





- Review of Systems


Constitutional: No symptoms reported


EENT: No symptoms reported


Cardiovascular: No symptoms reported


Respiratory: See HPI


Gastrointestinal: No symptoms reported


Genitourinary: No symptoms reported


Male Genitourinary: No symptoms reported


Musculoskeletal: No symptoms reported


Skin: No symptoms reported


Hematologic/Lymphatic: No symptoms reported


Neurological/Psychological: No symptoms reported





Physical Exam





- Vital signs


Vitals: 


                                        











Temp


 


 98.5 F 


 


 07/10/20 19:43














- Notes


Notes: 





PHYSICAL EXAMINATION:


 


GENERAL: Patient is a 54-year-old male presenting with increased work of 

breathing and respiratory distress


 


HEAD: Atraumatic, normocephalic.


 


EYES: Pupils equal round and reactive to light, extraocular movements intact, 

sclera anicteric, conjunctiva are normal.


 


ENT: nares patent, oropharynx clear without exudates.  Moist mucous membranes.


 


NECK: Normal range of motion, supple without lymphadenopathy, no appreciable JVD


 


LUNGS: Lungs demonstrate wheezes and rhonchi in all fields there is decreased 

air movement to the lower lungs bilaterally, patient is using accessory muscles.


 


HEART: Regular rate and rhythm without murmurs


 


ABDOMEN: Soft, nontender, normal bowel sounds.  No guarding, no rebound.  No 

masses appreciated.


 


EXTREMITIES: Active full range of motion, no pitting or edema.  No cyanosis. 2+ 

pulses x4


 


NEUROLOGICAL: No focal neurological deficits. Moves all extremities 

spontaneously and on command.


 


SKIN: Warm, Dry, and intact. Normal turgor, no rashes or lesions noted.





Course





- Re-evaluation


Re-evalutation: 





07/10/20 21:31


Patient has been reevaluated several times while in emergency department on most

recent reevaluation the patient is looking much better.  Patient states he feels

well enough to go home and I feel at this point in time after reviewing EKG 

chest x-ray and laboratory studies that this is a viable option.  Patient will 

receive a prescription for a Medrol Dosepak recommended to return to the 

emergency department for worsening symptoms otherwise follow-up with his primary

care provider.  I also highly advised the patient to stop smoking.











- Vital Signs


Vital signs: 


                                        











Temp Pulse Resp BP Pulse Ox


 


 98.5 F      23 H  157/104 H  98 


 


 07/10/20 20:01     07/10/20 20:01  07/10/20 20:00  07/10/20 20:01














- Laboratory


Result Diagrams: 


                                 07/10/20 19:52





                                 07/10/20 19:52


Laboratory results interpreted by me: 


                                        











  07/10/20 07/10/20





  19:52 19:52


 


RBC  5.94 H 


 


MCV  76 L 


 


MCH  25.3 L 


 


RDW  15.9 H 


 


Eos % (Auto)  16.1 H 


 


Absolute Eos (auto)  1.5 H 


 


Glucose   138 H


 


Magnesium   3.3 H














- Diagnostic Test


Radiology reviewed: Reports reviewed





- EKG Interpretation by Me


EKG shows normal: Sinus rhythm


Rate: Tachycardia


Rhythm: NSR





Discharge





- Discharge


Clinical Impression: 


 COPD exacerbation





Condition: Stable


Disposition: HOME, SELF-CARE


Additional Instructions: 


SHORTNESS OF BREATH OR DYSPNEA:





     You were evaluated for shortness of breath, or dyspnea. Dyspnea has many 

causes, and some are more serious than others. Sometimes it's impossible to 

diagnose the cause of dyspnea with the tests that are available on an emergency 

basis. Based on our evaluation today, you do not need hospitalization now. We 

found no evidence of pneumonia, collapsed lung, blood clots in the lung, tumors,

or heart failure.


     Causes of non-specific dyspnea can include asthma or bronchospasm, 

hyperventilation, emotional distress, heart disease, emphysema, fibrosis of the 

lung, and stiffness of the chest wall.


     In healthy individuals with a single episode, it's sometimes reasonable to 

do nothing but wait to see if the problem occurs again. Additional tests used to

evaluate dyspnea can include cardiac stress testing, echocardiography, pulmonary

function testing, CAT scan of the chest, bronchoscopy or pulmonary biopsy.


     Return if shortness of breath persists or worsens, or if you develop chest 

pain, fever, cough, confusion, or fainting.








NORMAL EXAM AND WORKUP:


     At this time, your examination and workup show no significant abnormality. 

No significant abnormal physical findings were noted.  All laboratory, EKG, and 

imaging (x-ray, CT scans, ultrasound) studies that were ordered show no 

significant abnormality.


     Although your examination and all studies that were ordered showed no 

significant abnormal finding, there are no examinations and no studies that are 

100% accurate.  There is always the possibility that some abnormality could 

exist and not be detected with physical examination or within the limits and 

capabilities of laboratory and other studies.


     You should return or follow up as you were instructed on your visit today 

for further evaluation if your symptoms do not resolve.








FOLLOW-UP CARE:


If you have been referred to a physician for follow-up care, call the 

physicians office for an appointment as you were instructed or within the next 

two days.  If you experience worsening or a significant change in your symptoms,

notify the physician immediately or return to the Emergency Department at any 

time for re-evaluation.





Prescriptions: 


Methylprednisolone [Medrol Dosepack (4 mg/Tab) 21 Tab/Dosepak] 4 mg PO ASDIR PRN

#21 tab.ds.pk


 PRN Reason:

## 2020-07-10 NOTE — RADIOLOGY REPORT (SQ)
EXAM DESCRIPTION:







CLINICAL HISTORY: 



54 years  Male  shortness of breath



COMPARISON: 



None



TECHNIQUE: 



Upright portable chest x-ray



FINDINGS: 



Cardiomediastinal silhouette is not enlarged. Obvious

hyperinflation suggestive of asthma or emphysema. No acute lung

pleural bone abnormalities.



IMPRESSION: 



Hyperinflation suggestive of asthma or emphysema. No acute

findings.

## 2020-07-14 ENCOUNTER — HOSPITAL ENCOUNTER (OUTPATIENT)
Dept: HOSPITAL 62 - OD | Age: 55
End: 2020-07-14
Attending: NURSE PRACTITIONER
Payer: MEDICARE

## 2020-07-14 DIAGNOSIS — J30.89: Primary | ICD-10-CM

## 2020-07-14 LAB
ADD MANUAL DIFF: NO
BASOPHILS # BLD AUTO: 0.1 10^3/UL (ref 0–0.2)
BASOPHILS NFR BLD AUTO: 0.9 % (ref 0–2)
EOSINOPHIL # BLD AUTO: 1.1 10^3/UL (ref 0–0.6)
EOSINOPHIL NFR BLD AUTO: 9.9 % (ref 0–6)
ERYTHROCYTE [DISTWIDTH] IN BLOOD BY AUTOMATED COUNT: 15.8 % (ref 11.5–14)
HCT VFR BLD CALC: 43.1 % (ref 37.9–51)
HGB BLD-MCNC: 14.8 G/DL (ref 13.5–17)
LYMPHOCYTES # BLD AUTO: 2.4 10^3/UL (ref 0.5–4.7)
LYMPHOCYTES NFR BLD AUTO: 21.8 % (ref 13–45)
MCH RBC QN AUTO: 25.8 PG (ref 27–33.4)
MCHC RBC AUTO-ENTMCNC: 34.3 G/DL (ref 32–36)
MCV RBC AUTO: 75 FL (ref 80–97)
MONOCYTES # BLD AUTO: 1 10^3/UL (ref 0.1–1.4)
MONOCYTES NFR BLD AUTO: 9 % (ref 3–13)
NEUTROPHILS # BLD AUTO: 6.3 10^3/UL (ref 1.7–8.2)
NEUTS SEG NFR BLD AUTO: 58.4 % (ref 42–78)
PLATELET # BLD: 333 10^3/UL (ref 150–450)
RBC # BLD AUTO: 5.73 10^6/UL (ref 4.35–5.55)
TOTAL CELLS COUNTED % (AUTO): 100 %
WBC # BLD AUTO: 10.8 10^3/UL (ref 4–10.5)

## 2020-07-14 PROCEDURE — 82785 ASSAY OF IGE: CPT

## 2020-07-14 PROCEDURE — 86003 ALLG SPEC IGE CRUDE XTRC EA: CPT

## 2020-07-14 PROCEDURE — 36415 COLL VENOUS BLD VENIPUNCTURE: CPT

## 2020-07-14 PROCEDURE — 85025 COMPLETE CBC W/AUTO DIFF WBC: CPT

## 2020-11-09 ENCOUNTER — HOSPITAL ENCOUNTER (EMERGENCY)
Dept: HOSPITAL 62 - ER | Age: 55
Discharge: HOME | End: 2020-11-09
Payer: MEDICARE

## 2020-11-09 VITALS — DIASTOLIC BLOOD PRESSURE: 88 MMHG | SYSTOLIC BLOOD PRESSURE: 118 MMHG
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
## 2020-11-09 DIAGNOSIS — S60.221A: Primary | ICD-10-CM

## 2020-11-09 PROCEDURE — 99283 EMERGENCY DEPT VISIT LOW MDM: CPT

## 2020-11-09 PROCEDURE — 73130 X-RAY EXAM OF HAND: CPT

## 2020-11-09 PROCEDURE — 2W3CX1Z IMMOBILIZATION OF RIGHT LOWER ARM USING SPLINT: ICD-10-PCS | Performed by: EMERGENCY MEDICINE

## 2020-11-09 PROCEDURE — 29125 APPL SHORT ARM SPLINT STATIC: CPT

## 2020-11-09 NOTE — RADIOLOGY REPORT (SQ)
EXAM DESCRIPTION:  HAND RIGHT 3 VIEWS



IMAGES COMPLETED DATE/TIME:  11/9/2020 11:52 am



REASON FOR STUDY:  R hand pain



COMPARISON:  None.



EXAM PARAMETERS:  NUMBER OF VIEWS: Three views.

TECHNIQUE: AP, lateral and oblique  radiographic images acquired of the right hand.

LIMITATIONS: None.



FINDINGS:  MINERALIZATION: Normal.

BONES: No acute fracture or dislocation.  No worrisome bone lesions.

JOINTS:  Mild to moderate narrowing at the radiocarpal joint with subchondral cystic changes and subc
hondral sclerosis.  Mildly prominent pronator fat pad suggest effusion.

SOFT TISSUES: Soft tissue swelling.  No foreign body.

OTHER: No other significant finding.



IMPRESSION:  1.  No acute osseous findings.  Soft tissue swelling.

2.  Degenerative changes at the wrist.

3.  Mildly prominent pronator fat pad suggest effusion.



TECHNICAL DOCUMENTATION:  JOB ID:  [id number]

 2011 [employer]- All Rights Reserved



Reading location - IP/workstation name: [email or web address]

## 2020-11-09 NOTE — ER DOCUMENT REPORT
HPI





- HPI


Time Seen by Provider: 11/09/20 11:28


Notes: 





55-year-old male presenting to the emergency department chief complaint of right

hand injury.  Patient reports he was off of a ladder yesterday when he kind of 

tripped and slammed his hand into a wall.








- ROS


Systems Reviewed and Negative: Yes All other systems reviewed and negative





- MUSCULOSKELETAL


Musculoskeletal: REPORTS: Extremity pain





Past Medical History





- General


Information source: Patient





- Social History


Smoking Status: Unknown if Ever Smoked


Family History: Reviewed & Not Pertinent


Pulmonary Medical History: Reports: Hx COPD


Musculoskeletal Medical History: Reports Hx Arthritis - Chronic back pain


Skin Medical History: Reports Hx MRSA


Psychiatric Medical History: 


   Denies: Hx Depression


Past Surgical History: Reports: Hx Orthopedic Surgery - back, knee replacement





[facility] Provider Document





- CONSTITUTIONAL


Notes: 





PHYSICAL EXAMINATION:





GENERAL: Well-appearing, well-nourished and in no acute distress.





HEAD: Atraumatic, normocephalic.





EYES: Pupils equal round extraocular movements intact,  conjunctiva are normal.





ENT: Nares patent





NECK: Normal range of motion





LUNGS: No respiratory distress





Musculoskeletal: Slightly limited range of motion to right hand, swelling and 

erythema noted over the dorsal surface.  No warmth.  Strong radial pulse, cap 

refill less than 3 seconds, normal sensation distally.





NEUROLOGICAL:  Normal speech, normal gait. 





PSYCH: Normal mood, normal affect.





SKIN: Abrasions noted over the dorsal surface of the right wrist extending into 

forearm.





- INFECTION CONTROL


TRAVEL OUTSIDE OF THE [place] IN LAST 30 DAYS: No





Course





- Re-evaluation


Re-evalutation: 





X-rays are negative for any acute fracture dislocation.  Soft tissue swelling 

noted.  Patient denies any IV drug use despite abrasions that actually appear to

look more consistent with a venipunctures, see exam notes.  I did have attending

physician come to the room to evaluate the patient with me.  We do feel this is 

likely a contusion.  Given that patient is denying IV drug use but does have 

some questionable abrasions extending from the area we will start him on 

antibiotics as a precaution.  He will be splinted and will follow up with 

orthopedics.  Patient verbalizes understanding and agreement with this plan.  

Very strict ED return precautions were discussed with patient, patient 

verbalizes understanding and agreement with this.





- Vital Signs


Vital signs: 


                                        











Temp Pulse Resp BP Pulse Ox


 


 98.4 F   80   20   113/95 H  99 


 


 11/09/20 10:58  11/09/20 10:58  11/09/20 10:58  11/09/20 10:58  11/09/20 10:58














Procedures





- Immobilization


  ** Right hand


Pre-Proc Neuro Vasc Exam: Normal


Immobilizer type: Volar splint, Sling


Performed by: PCT


Post-Proc Neuro Vasc Exam: Normal





Discharge





- Discharge


Clinical Impression: 


Contusion of right hand


Qualifiers:


 Encounter type: initial encounter Qualified Code(s): S60.221A - Contusion of 

right hand, initial encounter





Condition: Stable


Disposition: HOME, SELF-CARE


Additional Instructions: 


Contusion





     Your injury has resulted in a contusion -- a crushing of the deep tissues. 

No injury to important structures was detected during the physician's exam.  

Contusions vary in the amount of pain they cause, and in the length of time 

required for healing.  Typically, the area will become bruised, and will remain 

painful to touch for two or three weeks.  However, most patients are back to 

working and playing within a few days.


     After the initial period of rest and cold-packs, your symptoms (together 

with the doctor's recommendations) will determine how rapidly you can get back 

to full activity.  Usually this means "do what feels okay, but don't do things 

that hurt."


     If re-examination was recommended, it's important to follow up as 

instructed.  Call the doctor or return any time if pain increases, if swelling 

becomes severe, if you develop numbness or weakness in an injured extremity, or 

if any other alarming symptoms occur.





Ice & Elevation





     Apply ice packs frequently against the painful area.  Many different 

schedules are recommended, such as "20 minutes on, 20 minutes off" or "one hour 

ice, two hours rest."  If you need to work, you may need to go longer between 

ice treatments.  You should plan to have the area ice packed AT LEAST one-fourth

of the time.


     The ice should be applied over the wrap, tape, or splint, or over a layer 

of cloth -- not directly against the skin.  Some ice bags have a built-in cloth 

and can be put directly on the skin.


     Your injured part should be elevated as much as possible over the next 48 

hours.  Try to keep the injury above the level of the heart. Avoid use of the 

injured area.  Elevation and rest will decrease the swelling.





Ibuprofen





     Ibuprofen is an excellent, safe drug for pain control.  In addition, it has

potent antiinflammatory effects which are beneficial, especially in the 

treatment of injuries, arthritis, or tendonitis. It's best to take ibuprofen 

with food.  Persons with ulcer disease or allergy to aspirin should notify their

physician of this before taking ibuprofen.


     Take the medication exactly as prescribed.  Don't take additional doses 

unless instructed to do so by your doctor.  If you develop wheezing, shortness 

of breath, hives, faintness, stomach pain, vomiting, or dark black stools, 

return for re-evaluation at once.





****The x-rays were negative for any fracture or dislocation.  Please take 

ibuprofen over-the-counter as directed to help with pain and inflammation.  Use 

the splint to help with comfort and protection.  Keep an eye on the area, return

if the swelling, pain or redness increases.  Try to keep the hand elevated above

the level of your heart.  We are sending in an antibiotic prescription as a 

precaution.****


Prescriptions: 


Cephalexin [Keflex] 500 mg PO BID #14 capsule


Referrals: 


SHA WALTON FNP-C [ALLIED HEALTH PROFESSIONAL] - Follow up as needed

## 2020-11-10 ENCOUNTER — HOSPITAL ENCOUNTER (EMERGENCY)
Dept: HOSPITAL 62 - ER | Age: 55
Discharge: LEFT BEFORE DISCHARGE | End: 2020-11-10
Payer: MEDICARE

## 2020-11-10 DIAGNOSIS — Z53.21: Primary | ICD-10-CM

## 2020-11-10 DIAGNOSIS — S69.90XA: ICD-10-CM
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
